# Patient Record
Sex: MALE | Race: WHITE
[De-identification: names, ages, dates, MRNs, and addresses within clinical notes are randomized per-mention and may not be internally consistent; named-entity substitution may affect disease eponyms.]

---

## 2019-06-05 ENCOUNTER — HOSPITAL ENCOUNTER (INPATIENT)
Dept: HOSPITAL 1 - ED | Age: 74
LOS: 6 days | Discharge: HOME | DRG: 440 | End: 2019-06-11
Attending: FAMILY MEDICINE | Admitting: FAMILY MEDICINE
Payer: COMMERCIAL

## 2019-06-05 VITALS
HEIGHT: 74 IN | HEIGHT: 74 IN | WEIGHT: 146.56 LBS | WEIGHT: 146.56 LBS | BODY MASS INDEX: 18.81 KG/M2 | BODY MASS INDEX: 18.81 KG/M2

## 2019-06-05 VITALS — DIASTOLIC BLOOD PRESSURE: 74 MMHG | SYSTOLIC BLOOD PRESSURE: 126 MMHG

## 2019-06-05 VITALS — DIASTOLIC BLOOD PRESSURE: 69 MMHG | SYSTOLIC BLOOD PRESSURE: 119 MMHG

## 2019-06-05 DIAGNOSIS — K29.80: ICD-10-CM

## 2019-06-05 DIAGNOSIS — K85.90: Primary | ICD-10-CM

## 2019-06-05 DIAGNOSIS — K26.7: ICD-10-CM

## 2019-06-05 DIAGNOSIS — F12.10: ICD-10-CM

## 2019-06-05 DIAGNOSIS — I10: ICD-10-CM

## 2019-06-05 DIAGNOSIS — F43.10: ICD-10-CM

## 2019-06-05 DIAGNOSIS — K25.9: ICD-10-CM

## 2019-06-05 DIAGNOSIS — E78.00: ICD-10-CM

## 2019-06-05 LAB
ALBUMIN SERPL-MCNC: 4.3 G/DL (ref 3.4–5)
ALP SERPL-CCNC: 82 U/L (ref 46–116)
ALT SERPL-CCNC: 32 U/L (ref 16–63)
AST SERPL-CCNC: 15 U/L (ref 15–37)
BASOPHILS NFR BLD: 0.2 % (ref 0–2)
BILIRUB SERPL-MCNC: 0.6 MG/DL (ref 0.2–1)
BUN SERPL-MCNC: 19 MG/DL (ref 7–18)
CALCIUM SERPL-MCNC: 8.9 MG/DL (ref 8.5–10.1)
CHLORIDE SERPL-SCNC: 105 MMOL/L (ref 98–107)
CHOLEST SERPL-MCNC: 202 MG/DL (ref ?–200)
CHOLEST/HDLC SERPL: 4.7 MG/DL
CO2 SERPL-SCNC: 31.2 MMOL/L (ref 21–32)
CREAT SERPL-MCNC: 1.1 MG/DL (ref 0.7–1.3)
ERYTHROCYTE [DISTWIDTH] IN BLOOD BY AUTOMATED COUNT: 12.5 % (ref 11.5–14.5)
GFR SERPLBLD BASED ON 1.73 SQ M-ARVRAT: (no result) ML/MIN
GLUCOSE SERPL-MCNC: 139 MG/DL (ref 74–106)
HDLC SERPL-MCNC: 43 MG/DL (ref 40–60)
LIPASE SERPL-CCNC: 3326 IU/L (ref 73–393)
MAGNESIUM SERPL-MCNC: 2 MG/DL (ref 1.8–2.4)
PLATELET # BLD: 248 X10^3MCL (ref 130–400)
POTASSIUM SERPL-SCNC: 4.6 MMOL/L (ref 3.5–5.1)
PROT SERPL-MCNC: 7.2 G/DL (ref 6.4–8.2)
SODIUM SERPL-SCNC: 140 MMOL/L (ref 136–145)
TRIGL SERPL-MCNC: 57 MG/DL (ref ?–150)

## 2019-06-05 PROCEDURE — G0378 HOSPITAL OBSERVATION PER HR: HCPCS

## 2019-06-05 NOTE — NUR
PT IS ALERT AND ORIENTED X4. BREATHING IS EVEN AND UNLABORED. PT SHOWS NO
SIGNS OF RESP DISTRESS. PT HAS ACTIVE BOWEL SOUNDS, ABD FLAT AND SOFT, LAST BM
WAS 6/5/19 DIARRHEA. PT IS AMBULATORY WITH NO RESTRICTIONS. PT HAS IV ON RH
INFUSIGN WITH N/S INTACT AND PATENT. PT COMPLAINS OF LEFT SIDED ABD PAIN RATES
7/10. PT MEDICATED WITH MORPHINE. PT REMIANS ON NPO. WILL CONTINUE TO MONITOR.

## 2019-06-05 NOTE — NUR
RECEIVED PT FROM ED [employer]IA [employer]. ORIENTED PT TO ROOM AND SURROUNDINGS. IV
NOTED TO RH PATENT AND INTACT. INSTRUCTED PT ON THE USE OF CALL LIGHT FOR
ASSISTANCE. ENDORSED PT TO PRIMARY NURSE

## 2019-06-05 NOTE — NUR
RECEIVED IN OB ROOM, AWAKE ALERT ORIENTED, STATED HAS BEEN HAVING ISSUES WITH
HIS LOWER ABDOMEN SINCE A LONG TIME, NOW SINCE 4 DAYS STARTED TO HAVE MORE PAIN
TO LOWER ABDOMEN,

## 2019-06-05 NOTE — NUR
BEGAN PATIENT CARE. FOUND PATIENT LAYING SEMIT FOWLERS IN BED, A/O X4,
FOLLOWING COMMANDS, CALM AND COOPERATIVE, BREATHING UNALABORED WITH
NO APPARENT DISTRESS. IV NOTED IN RIGHT HAND, SITE WITHIN NOMAL LIMITS, PATENT
AND INTACT. PATINT TAUGHT ABOUT PAIN CONTROL AS PART OF PLAN. INSTRUCTED TO
USE CALL LIGHT AT FIRST SIGN OF PAIN. PATIENT RECEPTIVE OF TEACHING. CALL
LIGHT WITHIN REACH AND BED IN LOWEST POSITION.

## 2019-06-05 NOTE — NUR
ADMINISTERED GABAPENTIN PER ORDERS. PATIENT RESTING COMFROTABLE AT THIS TIME,
NOT COMPLAINING OF PAIN. FAMILY AT BEDSIDE. TAUGHT ABOUT USING CALL LIGHT
BEFORE PAIN STARTS. CALL LIGHT WITHIN REACH.

## 2019-06-06 VITALS — SYSTOLIC BLOOD PRESSURE: 133 MMHG | DIASTOLIC BLOOD PRESSURE: 67 MMHG

## 2019-06-06 VITALS — SYSTOLIC BLOOD PRESSURE: 103 MMHG | DIASTOLIC BLOOD PRESSURE: 61 MMHG

## 2019-06-06 VITALS — SYSTOLIC BLOOD PRESSURE: 102 MMHG | DIASTOLIC BLOOD PRESSURE: 58 MMHG

## 2019-06-06 VITALS — DIASTOLIC BLOOD PRESSURE: 63 MMHG | SYSTOLIC BLOOD PRESSURE: 129 MMHG

## 2019-06-06 LAB
BASOPHILS NFR BLD: 0.3 % (ref 0–2)
BUN SERPL-MCNC: 21 MG/DL (ref 7–18)
CALCIUM SERPL-MCNC: 8.1 MG/DL (ref 8.5–10.1)
CHLORIDE SERPL-SCNC: 107 MMOL/L (ref 98–107)
CO2 SERPL-SCNC: 25.8 MMOL/L (ref 21–32)
CREAT SERPL-MCNC: 0.8 MG/DL (ref 0.7–1.3)
ERYTHROCYTE [DISTWIDTH] IN BLOOD BY AUTOMATED COUNT: 13 % (ref 11.5–14.5)
GFR SERPLBLD BASED ON 1.73 SQ M-ARVRAT: (no result) ML/MIN
GLUCOSE SERPL-MCNC: 103 MG/DL (ref 74–106)
MICROSCOPIC UR-IMP: YES
PLATELET # BLD: 203 X10^3MCL (ref 130–400)
POTASSIUM SERPL-SCNC: 4 MMOL/L (ref 3.5–5.1)
RBC # UR STRIP.AUTO: (no result) /UL
SODIUM SERPL-SCNC: 142 MMOL/L (ref 136–145)
UA SPECIFIC GRAVITY: >=1.03 (ref 1–1.03)

## 2019-06-06 NOTE — NUR
PT IN BED RESTING. RESPIRATIONS EQUAL AND UNLABORED ON RA. DENIES SOB. PT
STATES PAIN IS THE SAME. PT DOES NOT WANT PAIN MEDICATION AT THIS TIME. PT
STATES "MAYBE ILL HAVE IT TONIGHT TO HELP ME SLEEP. PT DENIES ANY NAUSEA AT
THIS TIME. GIVEN PO MEDS. TOLERATED WELL. PT ASKING FOR ICE CHIPS AND WATER.
SPOKE WITH ARIS NEGRO, PER ARIS PT IS OKAY TO HAVE ICE CHIPS AND SMALL SIPS
OF WATER. PT GIVEN ICE CHIPS. WILL CONTINUE TO MONITOR. CALL LIGHT IN REACH.
BED IN LOWEST POSITION.

## 2019-06-06 NOTE — NUR
RECEIVED PT FROM NIGHT SHIFT RN. MESERET/VILOA. MED SURG. DENIES CHEST
PAIN/PRESSURE. RESPIRATIONS EQUAL AND UNLABORED ON RA. DENIES SOB AT THIS
TIME. PT STATES HE HAS NAUSEA ON AND OFF. PT STATES ON AND OFF ABDOMINAL PAIN.
PT STATE HE CURRENTLY FEELS LIKE HE JUST HAS AN UPSET STOMACH. IV PATENT AND
INFUSING TO RW. NO REDNESS OR SWELLING NOTED. WILL CONTINUE TO MONITOR. CALL
LIGHT IN REACH. BED IN LOWEST POSITION.

## 2019-06-06 NOTE — NUR
PT SITTING UP IN BED. NO ACUTE RESP DISTRESS NOTED ON RA. PT STATES HIS PAIN
IS INCREASING TO RUQ ABDOMEN. MEDICATED PER EMAR. IV FLUSHED WELL. NO REDNESS
OR SWELLING NOTED. PT ALSO C/O NAUSEA. MEDICATED PER EMAR. PT AMBULATING IN
HALLWAY, TOLERATING WELL. WILL CONTINUE TO MONITOR. CALL LIGHT IN REACH. BED
IN LOWEST POSITION.

## 2019-06-06 NOTE — NUR
PT SLEPT MOST OF THE NIGHT. NO SIGNS OF DISTRESS OR SOB. BREATHING EVEN AND
UNLABORED. PT WAS NAUSES AT NIGHT GAVE ZOFRAN X1. PT WAS REASSED AND NO MORE
NAUSEA. MEDICATED WITH MORPHINE X1 WITH ABD PAIN WITH RELIEF. PT WAS
COPPERATIVE WITH NURSING CARE. NO DISTRESS NOTED WILL CONTINUE TO MONITOR.

## 2019-06-06 NOTE — NUR
PT AWAKE COMPLAINING OF N/V , PT WAS MEDICATED WITH ZOFRAN PRN. WILL REASSES
IN 3O MIN AND CONUTINUE TO MONITOR. PT BRETHING IS EVEN AND UNLABORED.

## 2019-06-06 NOTE — NUR
RECIEVED SHIFT HAND REPORT FRON NURSE SINGH. ALL QUESTIONS AND CONCERNES
WERE ADDRESSED. WILL CONITNUE TO MONITOR AND COMPLETE ASSESSMENT.

## 2019-06-06 NOTE — NUR
PT IS ALERT AND OREINTED X4.PT BREATHING IS UNLABORED AND EVEN. NO SIGNS OF
RESP DISTRESS. PT IS COMPLAINING OF ABDOMINAL PAIN, STATES A 7/10. WILL
MEDICATE PT AND REASSES. PT ABD SOFT AND ACTIVE BOWEL SOUNDS ON ALL FOUR
QUADRANTS. NO BM TODAY 6/6/19. PT IS AMBULATORY. IV IS ON RH INFUSING WITH NS
 ML. WILL CONTINUE TO MONITOR PT FOR PAIN AND ANY NEEDS.

## 2019-06-06 NOTE — NUR
PT SITTING UP AT BEDSIDE. NO ACUTE RESP DISTRESS NOTED ON RA. PT STATE HE JUST
FEELS TERRIBLE. PT STATES ABDOMINAL PAIN ON AND OFF. PT STATES NAUSEA IS ON
AND OFF. GIVEN PO MEDS. TOLERATED WELL. PT REFUSED COLACE. PT STATES I DONT
NEED IT. I AM HAVING DIARRHEA. IV PATENT AND INFUSING. NO REDNESS OR SWELLING
NOTED. WILL CONTINUE TO MONITOR. CALL LIGHT IN REACH. BED IN LOWEST POSITION.

## 2019-06-07 VITALS — SYSTOLIC BLOOD PRESSURE: 119 MMHG | DIASTOLIC BLOOD PRESSURE: 59 MMHG

## 2019-06-07 VITALS — DIASTOLIC BLOOD PRESSURE: 69 MMHG | SYSTOLIC BLOOD PRESSURE: 123 MMHG

## 2019-06-07 VITALS — SYSTOLIC BLOOD PRESSURE: 120 MMHG | DIASTOLIC BLOOD PRESSURE: 58 MMHG

## 2019-06-07 VITALS — DIASTOLIC BLOOD PRESSURE: 56 MMHG | SYSTOLIC BLOOD PRESSURE: 115 MMHG

## 2019-06-07 LAB
BASOPHILS NFR BLD: 0.1 % (ref 0–2)
BUN SERPL-MCNC: 24 MG/DL (ref 7–18)
CALCIUM SERPL-MCNC: 7.8 MG/DL (ref 8.5–10.1)
CHLORIDE SERPL-SCNC: 111 MMOL/L (ref 98–107)
CO2 SERPL-SCNC: 24 MMOL/L (ref 21–32)
CREAT SERPL-MCNC: 0.8 MG/DL (ref 0.7–1.3)
ERYTHROCYTE [DISTWIDTH] IN BLOOD BY AUTOMATED COUNT: 12.7 % (ref 11.5–14.5)
GFR SERPLBLD BASED ON 1.73 SQ M-ARVRAT: (no result) ML/MIN
GLUCOSE SERPL-MCNC: 90 MG/DL (ref 74–106)
PLATELET # BLD: 182 X10^3MCL (ref 130–400)
POTASSIUM SERPL-SCNC: 4.3 MMOL/L (ref 3.5–5.1)
SODIUM SERPL-SCNC: 146 MMOL/L (ref 136–145)

## 2019-06-07 NOTE — NUR
PT WAS ASLEEP FOR LONG INTERVALS THROUGH OUT THE NIGHT. PT WOKE UP WITH C/O OF
PAIN AND NAUSEA. PT WAS GIVEN TORADOL X 1, MORPHINE X1. AND ZOFRAN X1. PT
BREATHING WAS EVEN AND UNLABORED. PT IS NPO BUT HAS BEEN EATING SMALL AMOUNT
OF ICE CHIPS AT TIME. PT WAS COOPERATIVE WITH NURSING CARE. PT NEEDS WERE
ADRESSED.

## 2019-06-07 NOTE — NUR
RECEIVED BESIDE REPORT FROM NIGHT SHIFT NURSE. PATIENT RESTING COMFORTABLY IN
BED. NO APPARENT DISTRESS OR DISCOMFORT NOTED. BREAHTING EVEN AND UNLABORED.
NO REPSIRATORY DISTRESS NOTED. NO INDICATION OF CHEST PAIN AT THIS TIME. IV
PATENT AND INTACT. ALL QUESTIONS AND CONCERNS ADDRESSED. ALL NEEDS ATTENDED
TO. WILL CONTINUE TO MONITOR

## 2019-06-07 NOTE — NUR
PT WAS SLEEPING IN BED, EASILY AWAKEN. PT COMPLAINS OF NO NAUSEA OR PAIN AT
THE MOMENT. BREATHING EASY WITH NO DISTRESS OR SOB.

## 2019-06-07 NOTE — NUR
RECEIVED REPORT FROM DAY SHIFT RN. PT SITTING UP IN BED. AA&O X4. NO SOB ON
ROOM AIR. NO C/O PAIN AT THIS TIME. IV TO RFA, INTACT. SAFETY MEASURES IN
PLACE. BED IN LOWEST POSITION SIDE RAILS UP X2. INSTRUCTED PT TO USE THE CALL
LIGHT FOR ASSISTANCE. CALL LIGHT WITHIN REACH.

## 2019-06-07 NOTE — NUR
ALL MORNING MEDICATIONS ADMINISTERED. PATIENT TOLERATED MEDICATION
ADMINISTRATION WELL. NO ADVERSE EFFECTS NOTED. ALL NEEDS ATTENDED TO. WILL
CONTINUE TO MONITOR

## 2019-06-07 NOTE — NUR
PATIENT RESTING COMFORTABLY IN BED AT THIS TIME. NO APPARENT DISTRESS OR
DISCOMFORT NOTED. IV PATENT AND INTACT. ALL QUESTIONS AND CONCERNS ADDRESSED.
ALL NEEDS ATTENDED TO. SAFETY PRECAUTIONS MAINTAINED. ENDORSED ALL CARE TO
NIGHT SHIFT NURSE DAVID

## 2019-06-08 VITALS — SYSTOLIC BLOOD PRESSURE: 123 MMHG | DIASTOLIC BLOOD PRESSURE: 64 MMHG

## 2019-06-08 VITALS — DIASTOLIC BLOOD PRESSURE: 66 MMHG | SYSTOLIC BLOOD PRESSURE: 132 MMHG

## 2019-06-08 VITALS — SYSTOLIC BLOOD PRESSURE: 125 MMHG | DIASTOLIC BLOOD PRESSURE: 90 MMHG

## 2019-06-08 VITALS — DIASTOLIC BLOOD PRESSURE: 59 MMHG | SYSTOLIC BLOOD PRESSURE: 122 MMHG

## 2019-06-08 LAB
BASOPHILS NFR BLD: 0.3 % (ref 0–2)
BUN SERPL-MCNC: 17 MG/DL (ref 7–18)
CALCIUM SERPL-MCNC: 8.2 MG/DL (ref 8.5–10.1)
CHLORIDE SERPL-SCNC: 108 MMOL/L (ref 98–107)
CO2 SERPL-SCNC: 23.6 MMOL/L (ref 21–32)
CREAT SERPL-MCNC: 0.7 MG/DL (ref 0.7–1.3)
ERYTHROCYTE [DISTWIDTH] IN BLOOD BY AUTOMATED COUNT: 12.6 % (ref 11.5–14.5)
GFR SERPLBLD BASED ON 1.73 SQ M-ARVRAT: (no result) ML/MIN
GLUCOSE SERPL-MCNC: 113 MG/DL (ref 74–106)
LIPASE SERPL-CCNC: 1399 IU/L (ref 73–393)
PLATELET # BLD: 164 X10^3MCL (ref 130–400)
POTASSIUM SERPL-SCNC: 3.8 MMOL/L (ref 3.5–5.1)
SODIUM SERPL-SCNC: 142 MMOL/L (ref 136–145)

## 2019-06-08 NOTE — NUR
PT RESTING WITH EYES CLOSED. NO SOB ON ROOM AIR. NO DISTRESS NOTED. CALL LIGHT
WITHIN REACH. WILL CONTINUE TO MONITOR.

## 2019-06-08 NOTE — NUR
SPOKE WITH NP ARIS TO NOTIFY THAT PT C/O HEARTBURN AND INDIGESTION AFTER
CHICKEN BROTH BREAKFAST. PT TAKING PROTONIX. PT ALSO BURPING A LOT. ARIS TO
PRESCIRBE SIMETHICONE. AWAITING ORDER.

## 2019-06-08 NOTE — NUR
RECEIVED REPORT FROM DAY SHIFT RN. PT SITTING UP IN BED WATCHING TV. NO SOB ON
ROOM AIR. NO C/O ABD PAIN. C/O NAUSEA. WILL MEDICATE PER ORDER. IV TO RFA, LR
INFUSING. SAFETY MEASURES IN PLACE. BED IN LOWEST POSITION. SIDE RAILS UP X2.
INSTRUCTED PT TO USE THE CALL LIGHT FOR ASSISTANCE. CALL LIGHT WITHIN REACH.

## 2019-06-08 NOTE — NUR
PT RESTED IN INTERVALS THROUGHOUT SHIFT. NO SOB ON ROOM AIR. SAFETY MEASURES
MAINTAINED. ALL NEEDS ATTENDED TO. WILL ENDORSE CONTINUITY OF CARE TO ONCOMING
RN.

## 2019-06-08 NOTE — NUR
RECEIVED REPORT FROM DAVID GARRETT. PT RESTING COMFORTABLY IN BED. IV TO RFA IS
PATENT AND INFUSING LR @ 150 ML/HR. NO REDNESS OR PAIN. PT ON ROOM AIR. NO C/O
SOB AND NO DISTRESS NOTED. ALL QUESTIONS AND CONCERNS ADDRESSED.

## 2019-06-08 NOTE — NUR
REPORT GIVEN TO ADVID GARRETT. PT RESTING IN BED C/O CONTINUED HEARTBURN AND STOMACH
UPSET. IV TO RFA IS STILL PATENT AND INFUSING LR @ 150 ML/HR. NO REDNESS OR
PAIN. PT ON ROOM AIR. NO C/O SOB AND NO DISTRESS NOTED. ALL QUESTIONS AND
CONCERNS ADDRESSED.

## 2019-06-09 VITALS — SYSTOLIC BLOOD PRESSURE: 112 MMHG | DIASTOLIC BLOOD PRESSURE: 39 MMHG

## 2019-06-09 VITALS — SYSTOLIC BLOOD PRESSURE: 142 MMHG | DIASTOLIC BLOOD PRESSURE: 69 MMHG

## 2019-06-09 VITALS — DIASTOLIC BLOOD PRESSURE: 67 MMHG | SYSTOLIC BLOOD PRESSURE: 125 MMHG

## 2019-06-09 VITALS — SYSTOLIC BLOOD PRESSURE: 113 MMHG | DIASTOLIC BLOOD PRESSURE: 61 MMHG

## 2019-06-09 LAB
BASOPHILS NFR BLD: 0.3 % (ref 0–2)
BUN SERPL-MCNC: 13 MG/DL (ref 7–18)
CALCIUM SERPL-MCNC: 7.8 MG/DL (ref 8.5–10.1)
CHLORIDE SERPL-SCNC: 107 MMOL/L (ref 98–107)
CO2 SERPL-SCNC: 30.8 MMOL/L (ref 21–32)
CREAT SERPL-MCNC: 0.7 MG/DL (ref 0.7–1.3)
ERYTHROCYTE [DISTWIDTH] IN BLOOD BY AUTOMATED COUNT: 12.5 % (ref 11.5–14.5)
GFR SERPLBLD BASED ON 1.73 SQ M-ARVRAT: (no result) ML/MIN
GLUCOSE SERPL-MCNC: 109 MG/DL (ref 74–106)
LIPASE SERPL-CCNC: 248 IU/L (ref 73–393)
PLATELET # BLD: 153 X10^3MCL (ref 130–400)
POTASSIUM SERPL-SCNC: 3.4 MMOL/L (ref 3.5–5.1)
SODIUM SERPL-SCNC: 143 MMOL/L (ref 136–145)

## 2019-06-09 NOTE — NUR
PT CONTINUES TO IMPROVE AND IS TOLERATING DIET. POSSIBLE D/C TOMORROW. PT
CURRENTLY RESTING CONFORTABLY IN BED WITH FAMILY AT BEDSIDE. ALL NEEDS MET. IV
TO RFA IS PATENT AND INFUSING LR @ 150 ML/HR. NO REDNESS OR PAIN. PT ON ROOM
AIR. NO C/O SOB AND NO DISTRESS NOTED. WILL ENDORSE ALL CARE TO ONCOMING
NURSE.

## 2019-06-09 NOTE — NUR
PRN SIMETHICONE WAS GIVEN AS PRESCRIBED. C/O ABD CRAMPS AND INDIGESTION. WILL
CONTINUE TO MONITOR. CALL LIGHT IS WITHIN REACH.

## 2019-06-09 NOTE — NUR
REPORT RECIEVED FROM DAY SHIFT RN. PATIENT WAS SEEN AND IS RESTING COMFORTABLY
IN BED WITH FAMILY AT BEDSIDE. BREATHING EVEN ON ROOM AIR. NO DISTRESS NOTED.
C/O ABD DISCOMFORT NOT PAIN. TOLERABLE AT THIS TIME. DENIES CHEST
PAIN/PRESSURE. IV TO THE RFA INFUSING LR. PATENT AND INTACT. NO REDNESS OR
SWELLING NOTED. COMFORT AND SAFETY MEASURES MAINTAINED. BED IS LOCKED AND IN
THE LOWEST POSITION. CALL LIGHT IS WITHIN REACH. WILL CONTINUE TO MONITOR.

## 2019-06-09 NOTE — NUR
PT RESTING WITH EYES CLOSED. NO SOB ON ROOM AIR. BREATHING EVEN AND UNLABORED.
NO DISTRESS NOTED. SAFETY MEASURES MAINTAINED. CALL LIGHT WITHIN REACH. WILL
ENDORSE CONTINUITY OF CARE TO DAY SHIFT RN.

## 2019-06-09 NOTE — NUR
IN TO ADMINSITER MEDICATION. NOTIFIED THAT DIET HAS BEEN CHANGED TO GLUTEN
FREE AND PATIENT IS FREE TO SHOWER. INSTRUCTED TO NOTIFY WHEN SHOWER SUPPLIES
ARE NEEDED. PT VERBALIZED UNDERSTANDING.

## 2019-06-09 NOTE — NUR
PT RESTING WITH EYES CLOSED. NO SOB ON ROOM AIR. NO FACIAL GRIMACING. NO
DISTRESS NOTED. CALL LIGHT WITHIN REACH. WILL CONTINUE TO MONITOR.

## 2019-06-09 NOTE — NUR
RECEIVED REPORT FROM SMITH GARRETT. PT RESTING COMFORTABLY IN BED. ALL NEEDS MET.
NO SIGNIFICANT CHANGES THROUGHOUT THE NIGHT. PT REMAINS ON ROOM AIR WITH NO
DISTRESS NOTED. IV TO RFA IS PATENT AND INFUSING LR @ 150 ML/HR. NO REDNESS OR
PAIN. ALL QUESTIONS AND CONCERNS ADDRESSED.

## 2019-06-09 NOTE — NUR
IN TO ASSESS NEEDS BEFORE GOING TO LUNCH. PT RESTING COMFORTABLY IN BED. ALL
NEEDS MET. WILL REASSESS AFTER LUNCH.

## 2019-06-10 VITALS — SYSTOLIC BLOOD PRESSURE: 107 MMHG | DIASTOLIC BLOOD PRESSURE: 61 MMHG

## 2019-06-10 VITALS — DIASTOLIC BLOOD PRESSURE: 69 MMHG | SYSTOLIC BLOOD PRESSURE: 135 MMHG

## 2019-06-10 VITALS — SYSTOLIC BLOOD PRESSURE: 100 MMHG | DIASTOLIC BLOOD PRESSURE: 70 MMHG

## 2019-06-10 VITALS — DIASTOLIC BLOOD PRESSURE: 69 MMHG | SYSTOLIC BLOOD PRESSURE: 144 MMHG

## 2019-06-10 VITALS — SYSTOLIC BLOOD PRESSURE: 129 MMHG | DIASTOLIC BLOOD PRESSURE: 69 MMHG

## 2019-06-10 PROCEDURE — 0DB68ZX EXCISION OF STOMACH, VIA NATURAL OR ARTIFICIAL OPENING ENDOSCOPIC, DIAGNOSTIC: ICD-10-PCS | Performed by: INTERNAL MEDICINE

## 2019-06-10 PROCEDURE — 0DB98ZX EXCISION OF DUODENUM, VIA NATURAL OR ARTIFICIAL OPENING ENDOSCOPIC, DIAGNOSTIC: ICD-10-PCS | Performed by: INTERNAL MEDICINE

## 2019-06-10 NOTE — NUR
AT 0710 - RECEIVED PATIENT FROM NIGHT NURSE. AWAKE, ALERT AND ORIENTED. NO C/O
PAIN AT THIS TIME. IV INFUSING LR  ML/HR.
AT 0830 - HAS HAD CLEAR LIQUID BREAKFAST. SPOKE WITH PATIENT ABOUT CURRENT
MEDICATIONS - INDICATIONS AND EFFECTS.
AT 0900 - PATIENT AMBULATING IN HALLWAY.
AT 0930 - SEEN BY DR SHAVER. NEW ORDERS RECEIVED. FOR EGD LATER TODAY. KEEP PT
NPO.
AT 0950 - IV INFUSION OF LR REDUCED TO 80 ML/HR AS PER NEW ORDERS.

## 2019-06-10 NOTE — NUR
IV FROM RFA LEAKING. CATHETER REMOVED ITNACT AND IV RESITED IN LFA. SALINE
LOCKED AS PER NEW ORDERS POST EGD.

## 2019-06-10 NOTE — NUR
1. Recommend continuing clear liquid diet.
2. Progress to cardiac diet when medically appropriate/tolerated.

## 2019-06-10 NOTE — NUR
AT 1130 - CONSENTS FOR EGD AND MODERATE SEDATION SIGNED BY PATIENT. PATIENT
HAS BEEN NPO SINCE 0930. PREPARED FOR PROCEDURE. progress slowly

## 2019-06-10 NOTE — NUR
PRN ZOFRAN WAS ADMINISTERED AS PRESCRIBED. PATIENT C/O NAUSEA. WILL CONTINUE
TO MONITOR. NO DISTRESS NOTED. BREATHING EVEN. CALL LIGHT IS WITHIN REACH.
WILL CONTINUE TO MONITOR.

## 2019-06-10 NOTE — NUR
PAGE GATED DR TORRES ABOUT BP/SBP TRENDING UP. IN -140 THROUGHOUT THE
NIGHT. NO NEW ORDERS AT THIS TIME. WILL ENDORSE CARE TO DAY SHIFT RN

## 2019-06-10 NOTE — NUR
AWAKE, ALERT, ORIENTED. TOLERATING FULL LIQUID DIET. IV INFUSING LR AT 80
ML/HR. VOIDING IN URINAL. GOOD OUTPUT. NO C/O PAIN. VSS. AFEBRILE. FAMILY AT
BEDSIDE. WILL ENDORSE CARE TO NIGHT NURSE.

## 2019-06-10 NOTE — NUR
RECEIVED PT LAYING IN BED, NO ACUTE DISTRESS OBSERVED, DENIES PAIN OR
DISCOMFORT AT THIS TIME. AA/OX4. MED-SURG, NO TELE, NO CP. PULSES PRESENT AND
EQUAL THROUGHOUT, NO EDEMA. BREATHING ON RA, EVEN AND UNLABORED, NO SOB OR
DYSPNEA OBSERVED. ABD ROUND AND SOFT WITH ACTIVE BOWEL SOUNDS, DENIES N/V/D.
FULL LIQUID DIET, TOLERATING WELL. FREELY VOIDS URINE WITH BRP. AMBULATORY AND
ABLE TO REPOSITION SELF IN BED. IV TO LFA IN PLACE, DRY, PATENT, INTACT, S/L
AT THIS TIME, NO PAIN, REDNESS OR SWELLING WHEN FLUSHED WITH NS. COMFORT AND
SAFETY MEASURES IN PLACE. ALL NEEDS ASSESSED AND ATTENDED TO. CALL LIGHT
WITHIN REACH. WILL CONTINUE TO MONITOR

## 2019-06-10 NOTE — NUR
AT 1430 - PATIENT BACK IN ROOM FOLLOWING EGD AND BX UNDER MODERATE SEDATION.
AWAKE, ALERT AND ORIENTED.
AT 1510 - PATIENT AMBULATING IN HALLWAY.

## 2019-06-10 NOTE — NUR
PATIENT WALKING AROUND HALLWAY AND C/O 7/10 PAIN IN HIS EPIG[place]RITC REGION.
STATES PAIN IN A CRAMPING FEELING AND REQUESTING MORPHINE. PRN MORPHINE (SEE
EMAR) WAS ADMINISTERED AS PRESCRIBED. WILL CONTINUE TO MONITOR PATIENT. NO
DISTRESS NOTED. CALL LIGHT IS WITHIN REACH.

## 2019-06-10 NOTE — NUR
PATIENT WAS AWAKE MAJORITY OF THE NIGHT. PATIENT C/O FEELING WORSE THAN
BEFORE. STATES HE FEELS HE GOT WORSE AFTER HAVING A REGULAR DIET FOR DINNER
LAST NIGHT. C/O CRAMPING, INDIGESTION, AND ABD DISCOMFORT AND PAIN THROUGHOUT
THE NIGHT. MEDICATED WITH PRN SIMETHICONE X1 WITH MILD RELIEF, PRN MORPHINE
X1 WITH MILD RELIEF, AND PRN ZOFRAN WITH GOOD RELIEF. LR INFUSING WELL TO THE
RFA. PATENT AND INTACT. NO REDNESS OR SWELLING NOTED. DENIES CHEST PAIN. ABD
ROUND/SOFT. NO DISTENTION NOTED. WILL NOTIFY DR TORRES ABOUT PATIENT'S
CONDITION. COMFORT AND SAFETY MEASURES MAINTAINED. CALL LIGHT IS WITHIN REACH.
WILL CONTINUE TO MONITOR AND ENDORSE CARE TO DAY SHIFT RN.

## 2019-06-11 VITALS — DIASTOLIC BLOOD PRESSURE: 67 MMHG | SYSTOLIC BLOOD PRESSURE: 113 MMHG

## 2019-06-11 VITALS — SYSTOLIC BLOOD PRESSURE: 123 MMHG | DIASTOLIC BLOOD PRESSURE: 67 MMHG

## 2019-06-11 VITALS — DIASTOLIC BLOOD PRESSURE: 67 MMHG | SYSTOLIC BLOOD PRESSURE: 123 MMHG

## 2019-06-11 VITALS — SYSTOLIC BLOOD PRESSURE: 91 MMHG | DIASTOLIC BLOOD PRESSURE: 55 MMHG

## 2019-06-11 LAB
BASOPHILS NFR BLD: 0.4 % (ref 0–2)
BUN SERPL-MCNC: 9 MG/DL (ref 7–18)
CALCIUM SERPL-MCNC: 8.3 MG/DL (ref 8.5–10.1)
CHLORIDE SERPL-SCNC: 107 MMOL/L (ref 98–107)
CO2 SERPL-SCNC: 31.6 MMOL/L (ref 21–32)
CREAT SERPL-MCNC: 0.8 MG/DL (ref 0.7–1.3)
ERYTHROCYTE [DISTWIDTH] IN BLOOD BY AUTOMATED COUNT: 12.7 % (ref 11.5–14.5)
GFR SERPLBLD BASED ON 1.73 SQ M-ARVRAT: (no result) ML/MIN
GLUCOSE SERPL-MCNC: 97 MG/DL (ref 74–106)
PLATELET # BLD: 174 X10^3MCL (ref 130–400)
POTASSIUM SERPL-SCNC: 3.3 MMOL/L (ref 3.5–5.1)
SODIUM SERPL-SCNC: 145 MMOL/L (ref 136–145)

## 2019-06-11 NOTE — NUR
RECEIVED PT IN NO ACUTE DISTRESS. SLEEPING IN BED, EASILY AROUSABLE. RESP EVEN
AND UNLABORED ON RA. NO PAIN NOTED. IV TO LFA, NO REDNESS OR SWELLING NOTED.
BED IN LOW POSITION, CALL LIGHT WITHIN REACH. WILL CONTINUE TO MONITOR.

## 2019-06-11 NOTE — NUR
NO SIGNIFICANT CHANGES TO REPORT, PT COMPLIED WITH NURSING CARE THROUGHOUT THE
SHIFT WITH NO ACUTE EVENTS OVERNIGHT. NO ACUTE DISTRESS OBSERVED AT THIS TIME,
PT LAYING IN BED, BREATHING EVEN AND UNLABORED, AROUSABLE TO VERBAL STIMULI.
COMFORT AND SAFETY MEASURES MAINTAINED. ALL NEEDS ASSESSED AND ATTENDED TO.
CALL LIGHT WITHIN REACH. WILL CONTINUE TO MONITOR AND ENDORSE CARE TO DAY
SHIFT NURSE

## 2019-06-11 NOTE — NUR
PT C/O PAIN TO HIS BACK, MULTIPLE SCATTERED RED SPOTS NOTED TO BACK, PICTURES
TAKEN AND PLACED IN CHART. PT GIVEN WIPES AND LOTION AS REQUESTED FOR HIS
BACK. WILL CONTINUE TO MONITOR.

## 2019-06-11 NOTE — NUR
PT RESTING IN BED. NO ACUTE DISTRESS. AAOX4. RESP EVEN AND UNLABORED ON RA. NO
C/O PAIN. REPORTED HAVING BM X1, FORMED. IV TO LFA NO REDNESS OR SWELLING
NOTED. HOB SLIGHLTY ELEVATED. CALL LIGHT WITHIN REACH. WILL CONTINUE TO
MONITOR.

## 2019-06-11 NOTE — NUR
PT AAX4, AMBULATORY, VERBAL, NO DISTRESS/PAIN AT THIS TIME, GIVEN
PRESCRIPTION, DC EDU PROVIDED, PT VERBALIZED UNDERSTANDING, INSTRUCTED PT TO
F/U WITH PCP AND DR. SHAVER. IV DC WITH CATH INTACT. BELONGING WITH PT. PT
WAITING FOR RIDE HOME. WILL CONTIUE TO MONITOR

## 2019-06-11 NOTE — NUR
PT DISCHARGED TO HOME IN NO ACUTE DISTRESS. AWAKE, ALERT, AND ORIENTED.
AMBULATORY. BELONGINGS WITH PT. LESTER HERRERA ACCOMPANIED PT TO LOBBY.

## 2022-07-08 NOTE — NUR
SHIFT OFF REPORT HANDED TO NURSE SAMANTHA RN. ALL QAUESTION ANSWERED
ANS CONCERNS. PT IS MADE AWARE. no